# Patient Record
Sex: FEMALE | Race: BLACK OR AFRICAN AMERICAN | NOT HISPANIC OR LATINO | Employment: STUDENT | URBAN - METROPOLITAN AREA
[De-identification: names, ages, dates, MRNs, and addresses within clinical notes are randomized per-mention and may not be internally consistent; named-entity substitution may affect disease eponyms.]

---
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
## 2025-07-08 ENCOUNTER — HOSPITAL ENCOUNTER (EMERGENCY)
Facility: HOSPITAL | Age: 20
Discharge: HOME | End: 2025-07-08
Attending: STUDENT IN AN ORGANIZED HEALTH CARE EDUCATION/TRAINING PROGRAM
Payer: MEDICAID

## 2025-07-08 VITALS
DIASTOLIC BLOOD PRESSURE: 77 MMHG | WEIGHT: 125 LBS | OXYGEN SATURATION: 100 % | RESPIRATION RATE: 16 BRPM | TEMPERATURE: 98 F | SYSTOLIC BLOOD PRESSURE: 118 MMHG | BODY MASS INDEX: 23.6 KG/M2 | HEIGHT: 61 IN | HEART RATE: 60 BPM

## 2025-07-08 DIAGNOSIS — G44.209 TENSION-TYPE HEADACHE, NOT INTRACTABLE, UNSPECIFIED CHRONICITY PATTERN: Primary | ICD-10-CM

## 2025-07-08 LAB
B-HCG UR QL: NEGATIVE
CTP QC/QA: YES

## 2025-07-08 PROCEDURE — 25000003 PHARM REV CODE 250

## 2025-07-08 PROCEDURE — 81025 URINE PREGNANCY TEST: CPT

## 2025-07-08 PROCEDURE — 99283 EMERGENCY DEPT VISIT LOW MDM: CPT

## 2025-07-08 RX ORDER — ACETAMINOPHEN 500 MG
1000 TABLET ORAL
Status: COMPLETED | OUTPATIENT
Start: 2025-07-08 | End: 2025-07-08

## 2025-07-08 RX ADMIN — ACETAMINOPHEN 1000 MG: 500 TABLET ORAL at 12:07

## 2025-07-08 NOTE — ED PROVIDER NOTES
"Encounter Date: 7/8/2025       History     Chief Complaint   Patient presents with    Nausea     Pt reports Nausea and hot flashes for about a week.  Pt denies any vomiting or diarrhea      HPI  19-year-old female with no significant past medical history presenting today for right-sided headache that began earlier today.  She states this feels like similar headaches in the past that usually resolve with taking Tylenol.  She did not take Tylenol today.  Denies dizziness such as the room spinning, nausea/vomiting, difficulty walking or headache being worst of her life.  Headache was gradual in onset.  No visual changes.  Furthermore she has been having "hot flashes" for the past 3 weeks that are intermittent in nature.  Denies fevers, cough, congestion, shortness of breath, recent sick contacts or body aches.  She currently feels at her baseline.  Lastly, she states that her cycles are normal with her last ending on 07/02.  Hot flashes are not associated with menstrual cycle.  Review of patient's allergies indicates:   Allergen Reactions    Amoxicillin Hives and Swelling    Pcn [penicillins] Hives and Swelling     Past Medical History:   Diagnosis Date    Asthma     Murmur      History reviewed. No pertinent surgical history.  No family history on file.  Social History[1]  Review of Systems   Constitutional:  Negative for chills and fever.   Eyes:  Negative for visual disturbance.   Respiratory:  Negative for shortness of breath.    Cardiovascular:  Negative for chest pain.   Gastrointestinal:  Negative for abdominal pain, constipation, diarrhea, nausea and vomiting.   Genitourinary:  Negative for dysuria.   Neurological:  Positive for headaches. Negative for dizziness, weakness and numbness.       Physical Exam     Initial Vitals [07/08/25 0013]   BP Pulse Resp Temp SpO2   120/79 61 16 98.6 °F (37 °C) 100 %      MAP       --         Physical Exam    Nursing note and vitals reviewed.  Constitutional: She appears " well-developed and well-nourished. She is not diaphoretic. No distress.   HENT:   Head: Normocephalic and atraumatic. Mouth/Throat: Oropharynx is clear and moist.   No temporal TTP   Eyes: Conjunctivae and EOM are normal. Pupils are equal, round, and reactive to light.   Cardiovascular:  Normal rate, regular rhythm and normal heart sounds.     Exam reveals no gallop and no friction rub.       No murmur heard.  Pulmonary/Chest: Breath sounds normal. No respiratory distress. She has no wheezes. She has no rhonchi. She has no rales.   Abdominal: Abdomen is soft. She exhibits no distension. There is no abdominal tenderness. There is no rebound and no guarding.   Musculoskeletal:         General: No edema. Normal range of motion.      Comments: 2+ radial and 2+ DP pulses     Neurological: She is alert. She has normal strength. No cranial nerve deficit or sensory deficit. She exhibits normal muscle tone. GCS eye subscore is 4. GCS verbal subscore is 5. GCS motor subscore is 6.   Ambulatory with steady gait, responding to questions appropriately and moving all extremities spontaneously.  No focal neurologic deficits appreciated on neurologic exam.         ED Course   Procedures  Labs Reviewed   HEPATITIS C ANTIBODY   HEP C VIRUS HOLD SPECIMEN   HIV 1 / 2 ANTIBODY   HIV VIRUS CONFIRMATION HOLD SPECIMEN   POCT URINE PREGNANCY       Result Value    POC Preg Test, Ur Negative       Acceptable Yes            Imaging Results    None          Medications   acetaminophen tablet 1,000 mg (1,000 mg Oral Given 7/8/25 0050)     Medical Decision Making  Problems Addressed:  Tension-type headache, not intractable, unspecified chronicity pattern: self-limited or minor problem    Amount and/or Complexity of Data Reviewed  Labs: ordered. Decision-making details documented in ED Course.    Risk  OTC drugs.    19-year-old female with no significant past medical history presenting today for right-sided headache that began  earlier today.  VSS.  Pertinent physical exam findings as noted above largely unremarkable.  I have low suspicion for SAH given similar prior headaches and gradual onset/non severe.  Also have low suspicion for infectious etiologies given prolonged hot flashes without any other focal/prodromal symptoms.  Patient is afebrile.  Low suspicion for GCA given absence of temporal tenderness.  Neurologic exam is nonfocal and will treat patient's headache with Tylenol as she had not tried that today and states that it typically helps.  After receiving Tylenol, patient acknowledges resolution of her headache.  Urine pregnancy is negative.  She is otherwise hemodynamically stable and well-appearing 19-year-old.  We will discharge with ambulatory referral to primary care and provided return precautions.    Miguel Watson MD  [employer] EM PGY-3                                     Clinical Impression:  Final diagnoses:  [G44.209] Tension-type headache, not intractable, unspecified chronicity pattern (Primary)          ED Disposition Condition    Discharge Stable          ED Prescriptions    None       Follow-up Information       Follow up With Specialties Details Why Contact Info Additional Information    [facility] - Emergency Dept Emergency Medicine Go to  If symptoms worsen [address] [address] [phone] 1st floor                   [1]         Miguel Watson MD  Resident  07/08/25 6799

## 2025-07-08 NOTE — DISCHARGE INSTRUCTIONS
Today you were seen in the emergency department for headache and hot flashes.  On our evaluation, we did not see any concerning findings of headache such as a brain bleed.  We gave you Tylenol which helped resolve your headache.  Your pregnancy test came back negative.  We will recommend that she follow up with your primary care physician.  Please return to the emergency department if you develop worst headache of your life associated with nausea/vomiting, sensation of the room spinning or difficulty walking.

## 2025-07-13 ENCOUNTER — HOSPITAL ENCOUNTER (EMERGENCY)
Facility: HOSPITAL | Age: 20
Discharge: HOME | End: 2025-07-13
Attending: EMERGENCY MEDICINE
Payer: MEDICAID

## 2025-07-13 VITALS
HEART RATE: 60 BPM | HEIGHT: 61 IN | OXYGEN SATURATION: 100 % | BODY MASS INDEX: 22.47 KG/M2 | WEIGHT: 119 LBS | RESPIRATION RATE: 18 BRPM | DIASTOLIC BLOOD PRESSURE: 77 MMHG | SYSTOLIC BLOOD PRESSURE: 138 MMHG | TEMPERATURE: 99 F

## 2025-07-13 DIAGNOSIS — K08.89 PAIN, DENTAL: ICD-10-CM

## 2025-07-13 DIAGNOSIS — K02.9 DENTAL CARIES: Primary | ICD-10-CM

## 2025-07-13 DIAGNOSIS — K04.7 DENTAL ABSCESS: ICD-10-CM

## 2025-07-13 LAB
B-HCG UR QL: NEGATIVE
CTP QC/QA: YES

## 2025-07-13 PROCEDURE — 25000003 PHARM REV CODE 250: Performed by: NURSE PRACTITIONER

## 2025-07-13 PROCEDURE — 81025 URINE PREGNANCY TEST: CPT | Performed by: NURSE PRACTITIONER

## 2025-07-13 PROCEDURE — 99284 EMERGENCY DEPT VISIT MOD MDM: CPT

## 2025-07-13 RX ORDER — DICLOFENAC SODIUM 50 MG/1
50 TABLET, DELAYED RELEASE ORAL 3 TIMES DAILY PRN
Qty: 20 TABLET | Refills: 2 | Status: SHIPPED | OUTPATIENT
Start: 2025-07-13

## 2025-07-13 RX ORDER — CLINDAMYCIN HYDROCHLORIDE 300 MG/1
300 CAPSULE ORAL EVERY 6 HOURS
Qty: 40 CAPSULE | Refills: 0 | Status: SHIPPED | OUTPATIENT
Start: 2025-07-13 | End: 2025-07-23

## 2025-07-13 RX ADMIN — IBUPROFEN 600 MG: 200 TABLET, FILM COATED ORAL at 08:07

## 2025-07-13 NOTE — DISCHARGE INSTRUCTIONS
Take your medication as prescribed.  Make an appoint with a dentist.  Return to the ED for any worsening of symptoms or any other concerns.

## 2025-07-13 NOTE — ED PROVIDER NOTES
Encounter Date: 7/13/2025       History     Chief Complaint   Patient presents with    Dental Pain     Left sided tooth pain started yesterday     Presents with complaint of left lower gum pain.  Patient reports dental pain.  She denies fever or vomiting.  She has not called a dentist.  Onset yesterday.  She has also not taken anything for the pain.  She denies difficulty swallowing.      Review of patient's allergies indicates:   Allergen Reactions    Amoxicillin Hives and Swelling    Pcn [penicillins] Hives and Swelling     Past Medical History:   Diagnosis Date    Asthma     Murmur      History reviewed. No pertinent surgical history.  No family history on file.  Social History[1]  Review of Systems   Constitutional:  Negative for chills and fever.   HENT:  Positive for dental problem. Negative for congestion, drooling, ear discharge, ear pain, facial swelling, sore throat and trouble swallowing.    Respiratory:  Negative for cough, shortness of breath and wheezing.    Cardiovascular:  Negative for chest pain, palpitations and leg swelling.   Gastrointestinal:  Negative for abdominal pain, diarrhea, nausea and vomiting.   Musculoskeletal:  Negative for gait problem.   Skin:  Negative for rash.   Neurological:  Negative for weakness.       Physical Exam     Initial Vitals [07/13/25 0751]   BP Pulse Resp Temp SpO2   (!) 145/88 60 18 98.6 °F (37 °C) 100 %      MAP       --         Physical Exam    Constitutional: She appears well-developed and well-nourished.   HENT:   Head: Normocephalic. Mouth/Throat: Oropharynx is clear and moist.   This has small dental abscess to the left lower gum.  There is no facial swelling.  Airway is patent.   Eyes: Conjunctivae are normal.   Neck: Neck supple.   Normal range of motion.  Cardiovascular:  Normal rate, regular rhythm and normal heart sounds.           Pulmonary/Chest: Breath sounds normal. No respiratory distress.   Musculoskeletal:         General: Normal range of motion.       Cervical back: Normal range of motion and neck supple.      Comments: Patient is ambulatory per self her gait is steady.  She moves all extremities without difficulty.     Neurological: She is alert and oriented to person, place, and time. No sensory deficit. GCS score is 15. GCS eye subscore is 4. GCS verbal subscore is 5. GCS motor subscore is 6.   Skin: Skin is warm and dry. Capillary refill takes less than 2 seconds.   Psychiatric: She has a normal mood and affect. Thought content normal.         ED Course   Procedures  Labs Reviewed   POCT URINE PREGNANCY       Result Value    POC Preg Test, Ur Negative       Acceptable Yes            Imaging Results    None          Medications   ibuprofen tablet 600 mg (600 mg Oral Given 7/13/25 0848)     Medical Decision Making  Presents with complaint of left lower gum pain.  Onset yesterday.  Patient has not called a dentist.  She has taken no medication for the pain.  She denies difficulty swallowing or drooling.  She denies shortness of breath.  Denies fever nausea vomiting.    Amount and/or Complexity of Data Reviewed  Labs: ordered.  Discussion of management or test interpretation with external provider(s): Patient is given prescription for diclofenac for pain.  She was given ibuprofen here in the ED. I have given her prescription for clindamycin she is allergic to penicillin.  She knows to take this 4 times a day.  I have instructed her to take it at breakfast lunch supper bedtime.  I am hoping this patient gets this in 4 times a day.  She has also been instructed to call a dentist and make a follow up appointment.  At no time while in the ED did she ever appear to be in any acute distress she was given return precautions.    Risk  Prescription drug management.                                          Clinical Impression:  Final diagnoses:  [K02.9] Dental caries (Primary)  [K04.7] Dental abscess  [K08.89] Pain, dental          ED Disposition  Condition    Discharge Stable          ED Prescriptions       Medication Sig Dispense Start Date End Date Auth. Provider    clindamycin (CLEOCIN) 300 MG capsule Take 1 capsule (300 mg total) by mouth every 6 (six) hours. for 10 days 40 capsule 7/13/2025 7/23/2025 Estela Lenz NP    diclofenac (VOLTAREN) 50 MG EC tablet Take 1 tablet (50 mg total) by mouth 3 (three) times daily as needed. 20 tablet 7/13/2025 -- Estela Lenz NP          Follow-up Information       Follow up With Specialties Details Why Contact Info    [facility]  [facility]  [address] [facility] [id number] [phone]                     [1]   Social History  Tobacco Use    Smoking status: Never    Smokeless tobacco: Never        Estela Lenz NP  07/13/25 1513